# Patient Record
(demographics unavailable — no encounter records)

---

## 2025-05-22 NOTE — ADDENDUM
[FreeTextEntry1] : I, Cookie Henderson, act solely as a scribe for Dr. Brad Lara on this date, 05/20/2025

## 2025-05-22 NOTE — REASON FOR VISIT
[Initial Evaluation] : an initial evaluation [Hypoparathyroidism] : hypoparathyroidism [Hyperparathyroidism] : hyperparathyroidism

## 2025-05-22 NOTE — HISTORY OF PRESENT ILLNESS
[FreeTextEntry1] : 54 year y/o M, with Hx of hyperparathyroidism referred by Dr. Elsa J. Reyes (Fax: [phone]), presents today to establish endocrine care. Other PMHx: T2DM, elevated cholesterol, kidney stones. Denies CKD, Vit D defficiency PSHx: 1996- surgery for sciatica FHx: No FHx of MEN. [facility] SHx: Live with her family   Work  5-6 days weekly-          Etoh use   denies            Cigarettes  denies. Allergy NKDA   Pt presents today with /89, and BMI 25.54 CC: "I'm here bc my calcium is elevated." He denies Lithium use. Reports calcium has been elevated for the first time fall 2024. Pt referred to Urologist because urine showed Calcium oxalate.  The Urologist told him he had kidney stones.  Pt is going on vacation, and will see me upon his return.  Reviewed labs: - 04/09/25 S. Creat 1.0, calcium 11.4, total protein 7.8, ALT 32  [Medications verified as per pt on 05/20/2025] Current Medications:Metformin 1000m BID - HELD:  Medication modified/added this visit:

## 2025-05-22 NOTE — CONSULT LETTER
[Dear  ___] : Dear  [unfilled], [Consult Letter:] : I had the pleasure of evaluating your patient, [unfilled]. [Sincerely,] : Sincerely, [FreeTextEntry3] : Brad Lara

## 2025-05-22 NOTE — HISTORY OF PRESENT ILLNESS
[FreeTextEntry1] : 54 year y/o M, with Hx of hyperparathyroidism referred by Dr. Elsa J. Reyes (Fax: [phone]), presents today to establish endocrine care. Other PMHx: T2DM, elevated cholesterol, kidney stones. Denies CKD, Vit D defficiency PSHx: 1996- surgery for sciatica FHx: No FHx of MEN. [facility] SHx: Live with her family   Work  5-6 days weekly-          Etoh use   denies            Cigarettes  denies. Allergy NKDA   Pt presents today with /89, and BMI 25.54 CC: "I'm here bc my calcium is elevated." He denies Lithium use. Reports calcium has been elevated for the first time fall 2024. Pt referred to Urologist because urine showed Calcium oxalate.  The Urologist told him he had kidney stones.  Pt is going on vacation, and will see me upon his return.  Reviewed labs: - 04/09/25 S. Creat 1.0, calcium 11.4, total protein 7.8, ALT 32  [Medications verified as per pt on 05/20/2025] Current Medications:Metformin 1000m BID - HELD:  Medication modified/added this visit: